# Patient Record
Sex: FEMALE | Race: WHITE
[De-identification: names, ages, dates, MRNs, and addresses within clinical notes are randomized per-mention and may not be internally consistent; named-entity substitution may affect disease eponyms.]

---

## 2017-05-22 ENCOUNTER — HOSPITAL ENCOUNTER (OUTPATIENT)
Dept: HOSPITAL 84 - D.RT | Age: 72
Discharge: HOME | End: 2017-05-22
Attending: INTERNAL MEDICINE
Payer: MEDICARE

## 2017-05-22 VITALS — BODY MASS INDEX: 23.5 KG/M2

## 2017-05-22 DIAGNOSIS — J44.9: Primary | ICD-10-CM

## 2017-09-14 ENCOUNTER — HOSPITAL ENCOUNTER (OUTPATIENT)
Dept: HOSPITAL 84 - D.US | Age: 72
Discharge: HOME | End: 2017-09-14
Attending: THORACIC SURGERY (CARDIOTHORACIC VASCULAR SURGERY)
Payer: MEDICARE

## 2017-09-14 VITALS — BODY MASS INDEX: 23.5 KG/M2

## 2017-09-14 DIAGNOSIS — I65.23: Primary | ICD-10-CM

## 2017-10-19 ENCOUNTER — HOSPITAL ENCOUNTER (INPATIENT)
Dept: HOSPITAL 84 - D.SDCHOLD | Age: 72
LOS: 1 days | Discharge: HOME | DRG: 36 | End: 2017-10-20
Attending: RADIOLOGY | Admitting: RADIOLOGY
Payer: MEDICARE

## 2017-10-19 VITALS — SYSTOLIC BLOOD PRESSURE: 124 MMHG | DIASTOLIC BLOOD PRESSURE: 55 MMHG

## 2017-10-19 VITALS — DIASTOLIC BLOOD PRESSURE: 68 MMHG | SYSTOLIC BLOOD PRESSURE: 101 MMHG

## 2017-10-19 VITALS — DIASTOLIC BLOOD PRESSURE: 69 MMHG | SYSTOLIC BLOOD PRESSURE: 136 MMHG

## 2017-10-19 VITALS — BODY MASS INDEX: 22.3 KG/M2

## 2017-10-19 VITALS — DIASTOLIC BLOOD PRESSURE: 74 MMHG | SYSTOLIC BLOOD PRESSURE: 112 MMHG

## 2017-10-19 VITALS — DIASTOLIC BLOOD PRESSURE: 67 MMHG | SYSTOLIC BLOOD PRESSURE: 140 MMHG

## 2017-10-19 VITALS — SYSTOLIC BLOOD PRESSURE: 115 MMHG | DIASTOLIC BLOOD PRESSURE: 73 MMHG

## 2017-10-19 VITALS — SYSTOLIC BLOOD PRESSURE: 102 MMHG | DIASTOLIC BLOOD PRESSURE: 64 MMHG

## 2017-10-19 VITALS — DIASTOLIC BLOOD PRESSURE: 50 MMHG | SYSTOLIC BLOOD PRESSURE: 134 MMHG

## 2017-10-19 VITALS — SYSTOLIC BLOOD PRESSURE: 137 MMHG | DIASTOLIC BLOOD PRESSURE: 53 MMHG

## 2017-10-19 VITALS — DIASTOLIC BLOOD PRESSURE: 74 MMHG | SYSTOLIC BLOOD PRESSURE: 111 MMHG

## 2017-10-19 VITALS — DIASTOLIC BLOOD PRESSURE: 80 MMHG | SYSTOLIC BLOOD PRESSURE: 161 MMHG

## 2017-10-19 VITALS — DIASTOLIC BLOOD PRESSURE: 73 MMHG | SYSTOLIC BLOOD PRESSURE: 161 MMHG

## 2017-10-19 VITALS — SYSTOLIC BLOOD PRESSURE: 122 MMHG | DIASTOLIC BLOOD PRESSURE: 53 MMHG

## 2017-10-19 VITALS — DIASTOLIC BLOOD PRESSURE: 67 MMHG | SYSTOLIC BLOOD PRESSURE: 110 MMHG

## 2017-10-19 VITALS — DIASTOLIC BLOOD PRESSURE: 58 MMHG | SYSTOLIC BLOOD PRESSURE: 158 MMHG

## 2017-10-19 DIAGNOSIS — I65.22: Primary | ICD-10-CM

## 2017-10-19 LAB
ANION GAP SERPL CALC-SCNC: 11.9 MMOL/L (ref 8–16)
APTT BLD: 25.6 SECONDS (ref 22.8–39.4)
BASOPHILS NFR BLD AUTO: 0.8 % (ref 0–2)
BUN SERPL-MCNC: 35 MG/DL (ref 7–18)
CALCIUM SERPL-MCNC: 10.4 MG/DL (ref 8.5–10.1)
CHLORIDE SERPL-SCNC: 107 MMOL/L (ref 98–107)
CO2 SERPL-SCNC: 31 MMOL/L (ref 21–32)
CREAT SERPL-MCNC: 0.7 MG/DL (ref 0.6–1.3)
EOSINOPHIL NFR BLD: 5.3 % (ref 0–7)
ERYTHROCYTE [DISTWIDTH] IN BLOOD BY AUTOMATED COUNT: 13.3 % (ref 11.5–14.5)
GLUCOSE SERPL-MCNC: 101 MG/DL (ref 74–106)
HCT VFR BLD CALC: 39.7 % (ref 36–48)
HGB BLD-MCNC: 12.7 G/DL (ref 12–16)
IMM GRANULOCYTES NFR BLD: 0 % (ref 0–5)
INR PPP: 0.97 (ref 0.85–1.17)
LYMPHOCYTES NFR BLD AUTO: 33.9 % (ref 15–50)
MCH RBC QN AUTO: 31.6 PG (ref 26–34)
MCHC RBC AUTO-ENTMCNC: 32 G/DL (ref 31–37)
MCV RBC: 98.8 FL (ref 80–100)
MONOCYTES NFR BLD: 11.1 % (ref 2–11)
NEUTROPHILS NFR BLD AUTO: 48.9 % (ref 40–80)
OSMOLALITY SERPL CALC.SUM OF ELEC: 298 MOSM/KG (ref 275–300)
PLATELET # BLD: 262 10X3/UL (ref 130–400)
PMV BLD AUTO: 10.2 FL (ref 7.4–10.4)
POTASSIUM SERPL-SCNC: 3.9 MMOL/L (ref 3.5–5.1)
PROTHROMBIN TIME: 12.8 SECONDS (ref 11.6–15)
RBC # BLD AUTO: 4.02 10X6/UL (ref 4–5.4)
SODIUM SERPL-SCNC: 146 MMOL/L (ref 136–145)
WBC # BLD AUTO: 5.1 10X3/UL (ref 4.8–10.8)

## 2017-10-19 PROCEDURE — 037J3DZ DILATION OF LEFT COMMON CAROTID ARTERY WITH INTRALUMINAL DEVICE, PERCUTANEOUS APPROACH: ICD-10-PCS | Performed by: RADIOLOGY

## 2017-10-19 NOTE — NUR
SHIFT ASSESSMENT COMPLETE, SEE FLOWSHEET FOR COMPLETE DETAILS. PATIENT LAYING
IN BED, DENIES COMPLAINT. A&O X4, RR EVEN AND NON LABORED. S1S2, NSR ON
MONITOR. INCISION ON R GROIN, SITE C/D/I, NO S/S OF BLEEDING. PERIPHERAL
PULSES +2. VSS, WILL MONITOR.

## 2017-10-19 NOTE — HEMODYNAMI
PATIENT:HELLEN HURT                                MEDICAL RECORD: [id number]
: 41                                            [facility]    [id number]                                       ADMISSION DATE: 10/19/17
 
 
 Generatedon:10/19/779698:58
Patient name: HELLEN HURT Patient #: [id number] Visit #: [id number] SSN: :  Date of
study: 10/19/2017
Page: Of
Hemodynamic Procedure Report
****************************
Patient Data
Patient Demographics
Procedure consent was obtained
First Name: HELLEN           Gender: Female
Last Name: BLU             : 1945
Middle Initial: TRENT      Age: 72 year(s)
Patient #: [id number]       Race: Unknown
Visit #: [id number]
Accession #:
[id number]
Additional ID: [id number]
Contact details
Address: [address] [place] [address]  Phone: [phone]
State: [place]
City: [place]
Zip code: [address]
Past Medical History
Allergies
Allergen        Reaction        Date         Comments
Reported
Codeine                         10/19/2017
Penicillins                     10/19/2017
Admission
Admission Data
Admission Date: 10/19/2017  Admission Time: 7:39
Room #: [facility]
Weight (lbs.): 130
Weight (kg.): 58.97
Procedure
Procedure Types
Cath Procedure
Peripheral Cath Diagnostic Procedure
Cath Peripheral
Procedure Description
Procedure Date
Procedure Date: 10/19/2017
Procedure Start Time: 10:53
Procedure Staff
Name                            Function
Juany Alvarado RT                  Circulator
Juany Alvarado RT                  Monitor
Luis Howe RT              Scrub
Blaine Nielson MD              Performing Physician
Filemon Pulido MD                  Performing Physician
Diya Borges RN                Nurse
Procedure Data
Cath Procedure
 
Fluoroscopy
Diagnostic fluoroscopy      Total fluoroscopy Time:
time: 29.9 min              29.9 min
Diagnostic fluoroscopy      Total fluoroscopy dose: 411
dose: 411 mGy               mGy
Contrast Material
Contrast Material Type                       Amount (ml)
Isovue 300                                   155
Entry Location
Entry     Primary  Successful  Side  Size  Upsize Upsize Entry    Closure Succes
sful  Closure
Location                             (Fr)  1 (Fr) 2 (Fr) Remarks  Device        
      Remarks
Femoral                        Right 5 Fr
artery
Diagnostic catheters
Device Type               Used For           End Catheter
Placement
Merit Impress Hercules
5Fr 125CM catheter
Merit ULTRA BOLUS FLUSH
5Fr 90CM catheter
Cook HN5 5F/100CM
catheter
Procedure Medications
Medication           Administration Route Dosage
Versed               I.V.                 0.5 mg
Fentanyl             I.V.                 25 mcg
Versed               I.V.                 0.5 mg
Fentanyl             I.V.                 25 mcg
Heparin Bolus                             2000
Heparin Bolus                             1000
Versed               I.V.                 0.5 mg
Fentanyl             I.V.                 25 mcg
Heparin Flush Bag    added to field       4
(1000units/500ml NS)
Lidocaine 1%         added to field       20
Oxygen               NC                   4 l/min
Hemodynamics
Rest
Pre Cath      Intra         NCS           Post Cath
Vital Signs
Time     Heart  Resp   SPO2 etCO2   NIBP (mmHg) Rhythm  Pain    Sedation
Rate   (ipm)  (%)  (mmHg)                      Status  Level
(bpm)
10:37:56 65     26          0       129/86(104) NSR     0 (11)  10(A)
, No
pain
10:42:02 67     15     61   0       111/88(103) NSR     0 (11)  10(A)
, No
pain
10:45:59 69     6      89   34      125/90(107) NSR     0 (11)  10(A)
, No
pain
10:49:59 70     23          12.8    143/96(120) NSR     0 (11)  10(A)
, No
pain
10:54:06 67     24          27.9    129/89(109) NSR     0 (11)  10(A)
, No
pain
 
10:58:14 64     22     100  1.5     125/76(108) NSR     0 (11)  10(A)
, No
pain
11:02:20 66     17     88   30.2    108/79(88)  NSR     0 (11)  10(A)
, No
pain
11:06:18 65     10     97   30.9    106/80(99)  NSR     0 (11)  10(A)
, No
pain
11:10:17 63     10     94   32.4    110/74(85)  NSR     0 (11)  10(A)
, No
pain
11:14:19 65     10     96   34      108/78(93)  NSR     0 (11)  10(A)
, No
pain
11:18:19 67     10     95   19.6    107/80(93)  NSR     0 (11)  10(A)
, No
pain
11:22:20 64     12     95   0       108/72(91)  NSR     0 (11)  10(A)
, No
pain
11:26:18 65     16     93   11.3    114/81(95)  NSR     0 (11)  10(A)
, No
pain
11:30:22 65     16     93   34      105/73(87)  NSR     0 (11)  10(A)
, No
pain
11:34:21 66     15     96   15.8    107/75(90)  NSR     0 (11)  10(A)
, No
pain
11:38:21 65     13     98   21.1    114/78(102) NSR     0 (11)  10(A)
, No
pain
11:42:23 68     17     96   30.2    114/77(90)  NSR     0 (11)  10(A)
, No
pain
11:46:26 65     18     96   12.8    109/73(94)  NSR     0 (11)  10(A)
, No
pain
11:50:26 65     15     98   36.2    109/78(96)  NSR     0 (11)  10(A)
, No
pain
11:54:30 61     14     100  29.4    115/70(86)  NSR     0 (11)  10(A)
, No
pain
11:58:33 64     12     98   0       124/72(96)  NSR     0 (11)  10(A)
, No
pain
12:02:43 60     12     88   29.4    100/68(84)  NSR     0 (11)  10(A)
, No
pain
12:06:43 61     14     95   31.7    101/69(84)  NSR     0 (11)  10(A)
, No
pain
12:10:39 63     14     93   34      98/73(90)   NSR     0 (11)  10(A)
, No
pain
12:14:36 63     19     88   32.4    103/72(85)  NSR     0 (11)  10(A)
, No
pain
 
12:18:34 62     17     92   32.4    114/82(95)  NSR     0 (11)  10(A)
, No
pain
12:22:37 61     17     89   30.9    121/75(87)  NSR     0 (11)  10(A)
, No
pain
12:26:45 61     13     87   31.7    100/69(83)  NSR     0 (11)  10(A)
, No
pain
12:31:34 61     14     84   34.7    113/75(90)  NSR     0 (11)  10(A)
, No
pain
12:35:36 60     11     87   23.4    119/80(101) NSR     0 (11)  10(A)
, No
pain
12:39:42 61     16     97   34.7    116/73(88)  NSR     0 (11)  10(A)
, No
pain
12:43:45 62     18     94   13.6    110/74(99)  NSR     0 (11)  10(A)
, No
pain
12:47:43 109    18     96   22.6    108/88(104) NSR     0 (11)  10(A)
, No
pain
Medications
Time     Medication       Route  Dose  Verified Delivered Reason     Notes  Effe
ctiveness
by       by
10:20:19 Oxygen           NC     4     Diya   Diya    used for
l/min Jony Borges RN procedure
RN
10:20:30 Heparin Flush    added  4     Diya   Filemon      used for
Bag              to     BAGS  Jony Pulido MD procedure
(1000units/500ml field        RN
NS)
10:20:56 Lidocaine 1%     added  20ml  Diya   Filemon      for local
to     vial  Jony Pulido MD anesthetic
field        RN
10:56:58 Versed           I.V.   0.5   Filemon Keane    for
mg    Jony Pulido RN sedation
MD
10:57:12 Fentanyl         I.V.   25    Filemon Keane    for
mcg   Jony Pulido RN sedation
MD
11:03:06 Versed           I.V.   0.5   Filemon Keane    for
Jony Albarado RN sedation
MD
11:03:14 Fentanyl         I.V.   25    Filemon Keane    for
mcg   Jony Pulido RN sedation
MD
11:27:30 Heparin Bolus    ia     2000  Tess Conklin MD MD
11:50:57 Heparin Bolus    ia     1000  Tess Conklin MD MD
11:54:52 Versed           I.V.   0.5   Filemon Keane    for
mg    Jony Pulido RN sedation
MD
11:55:02 Fentanyl         I.V.   25    Filemon Keane    for
 
mcg   Jony Pulido RN sedation
MD
Procedure Log
Time     Note
9:45:08  Patient Weight : 130 lbs
9:45:50  Use device set IR Diagnostic
9:45:53  Sterile Angiographic Pack opened to sterile field.
9:45:54  Bag Decanter opened to sterile field.
9:45:55  Acist Manifold opened to sterile field.
9:45:56  Acist Hand Control opened to sterile field.
9:45:57  Acist Syringe opened to sterile field.
9:50:50  BasixTOUCH Inflation Syringe opened to sterile field.
9:50:52  TUBING, CONTRAST INJCTN HI PRES opened to sterile field.
9:50:53  Cook HERNANDEZ 260 guide wire opened to sterile field.
9:50:53  Terumo ANGLE 260cm glide wire opened to sterile field.
9:50:54  Cook DOC .035 guide wire opened to sterile field.
9:50:55  Terumo 5Fr [place] Sheath opened to sterile field.
10:16:50 Time tracking: Regular hours
10:17:07 Plan of Care:Hemodynamics will remain stable., Cardiac rhythm will
remain stable., Comfort level will be maintained., Respiratory function
will remain adequate., Patient/ family verbilizes understanding of
procedure., Procedure tolerated without complication., Recovers from
procedure without complications..
10:17:14 Patient received from Outpatients to IR Alert and oriented. Tansferred
to table in Supine position.
10:17:23 Signed procedure consent form obtained from patient.
10:17:32 H&P Date Dictated: 10/19/2017 Within 30 days and on chart..
10:17:34 Pre-procedure instructions explained to patient.
10:17:36 Pre-op teaching completed and patient verbalized understanding.
10:17:39 Family in waiting room.
10:17:50 Patient allergic to Codeine
10:18:39 Patient allergic to Penicillins
10:19:26 Is the patient allergic to Iodine/contrast media? No.
10:19:29 Is patient on blood thinner?Yes, asa
10:20:19 Oxygen 4 l/min NC was administered by Diya Borges RN; used for
procedure;
10:20:30 Heparin Flush Bag (1000units/500ml NS) 4 BAGS added to field was
administered by Filemon Pulido MD; used for procedure;
10:20:56 Lidocaine 1% 20ml vial added to field was administered by Filemon Pulido MD; for local anesthetic;
10:21:11 **ACC** The patient was administered the following blood thiners within
the last 24 hours: **ACC**Aspirin
10:21:15 **ACC** The patient was administered the following blood thiners within
the last 24 hours: **ACC**Plavix
10:21:17 Patient diabetic? No.
10:21:22 ----Pre-sedation anethsthesia assessment.----
10::22 -----------------------------------------------------------------------
-
10:21:26 Previous problem with sedation/anesthesia? No ?
10:21:30 Snore? No
10::34 Sleep apnea? No
10::36 Deviated septum? No
10::39 Opens mouth fully? Yes
10:21:40 Sticks out tongue? Yes
10:22:15 Airway obstruction? No patient has htn and cad
10:22:22 Dentures? Yes secured
10::31 Pre procedure: right dorsailis pedis pulse Doppler
10::41 Pre procedure: left dorsailis pedis pulse Doppler
10:22:46 Pre procedure: right posterior tibial pulse Doppler
10::50 Pre procedure: left posterior tibial pulse 0-Absent
 
10:23:17 IV patent on arrival in right hand with 0.9% NaCl at [facility].
10:23:26 Right groin area was prepped with chlora-prep and draped in sterile
fashion
10:23:30 -----------------------------------------------------------------------
-
10:23:32 Alarms reviewed by ADIA JESUS
10:23:33 Sharps counted by scrub and verified by JOS
10:23:35 -----------------------------------------------------------------------
-
10:36:50 Vital chart was started
10:47:11 Bernard ARTIS 6FR. 90cm guide sheath opened to sterile field.
10:47:14 A Merit Impress Hercules 5Fr 125CM catheter was advanced over the wire
and used for .
10:47:18 -----------------------------------------------------------------------
-
10:51:13 Physician arrived
10:53:21 --------ALL STOP TIME OUT------
10:53:22 Final Timeout: patient, procedure, and site verified with staff and
physician. All members of the team are in agreement.
10:53:36 Sedation plan: IV Moderate Sedation Versed, Fentanyl
10:53:43 Full Disclosure recording started
10:53:43 Procedure started.
10:53:49 Local anesthetic to right femoral artery with Lidocaine 1% by Filemon Pulido MD.**INITIAL ACCESS ONLY**
10:55:30 Micropuncture VSI 4FR kit opened to sterile field.
10:56:58 Versed 0.5 mg I.V. was administered by Diya Borges RN; for sedation;
10:57:12 Fentanyl 25 mcg I.V. was administered by Diya Borges RN; for sedation
;
10:58:15 [employer] Choice PT Extra Support J 300cm .014 gu opened to sterile
field.
10:59:11 Arterial access obtained using ultrasound guidance.
10:59:24 A 5 Fr sheath was inserted into the Right Femoral artery
11:02:58 Terumo 5FR ANGLED 65CM glide catheter opened to sterile field.
11:03:06 Versed 0.5 mg I.V. was administered by Diya Borges RN; for sedation;
11:03:14 Fentanyl 25 mcg I.V. was administered by Diya Borges RN; for sedation
;
11:03:19 A Merit ULTRA BOLUS FLUSH 5Fr 90CM catheter was advanced over the wire
and used for .
11:06:42 Angiography was performed.
11:07:56 A Reble HN5 5F/100CM catheter was advanced over the wire and used for .
11:09:41 Terumo TORQUE DEVICE PLASTIC .038 opened to sterile field.
11:13:33 Cook ROADRUNNER 260 .035 glide wire opened to sterile field.
11:16:49 Terumo 5FR ANGLED 100CM glide catheter opened to sterile field.
11:27:30 Heparin Bolus 2000 ia was administered by Filemon Pulido MD; ;
11:28:17 [place] Sci AMPLATZ 260CM SHORT TAPER guide wire opened to sterile field
.
11:36:44 Terumo Super Stiff Angled 260cm glide wire opened to sterile field.
11:50:57 Heparin Bolus 1000 ia was administered by Filemon Pulido MD; ;
11:54:44 SPIDER EMBOLIC PROTECTION DEVICE 5MM opened to sterile field.
11:54:52 Versed 0.5 mg I.V. was administered by Diya Borges RN; for sedation;
11:55:02 Fentanyl 25 mcg I.V. was administered by Diya Borges RN; for sedation
;
11:56:14 Inflation number: 1 A Abbott VIATRAC 4 x 2 x 135 balloon was prepped an
d
advanced across the Undefined1, then inflated to 10 HARLEY for 0:08
(min:sec).
12:11:21 PROTEGE RX TAPERED 7MM X 30MM X 135CM stent was deployed across
Undefined1 .
12:13:03 Inflation number: 2 A Abbott VIATRAC 6 x 2 x 135 balloon was prepped an
d
 
advanced across the Undefined1, then inflated to 8 HARLEY for 0:06
(min:sec).
12:19:19 [place] 6Fr sheath opened to sterile field.
12:19:40 Cordis 6Fr Exoseal opened to sterile field.
12:25:20 Procedure ended.(Physican Out)
12:34:46 Fluoroscopy time 29.90 minutes.
12:34:57 Fluoroscopy dose: 411 mGy
12:34:57 Flurop Dose total: 411
12:35:04 Contrast amount:Isovue 300 155ml.
12:35:06 Sharps counted by scrub and verified by R.N.
12:35:07 Procedure and supply charges have been captured, reviewed, submitted an
d
are correct.
12:48:21 Vital chart was stopped
12:48:25 Full Disclosure recording stopped
Intervention Summary
Intervention Notes
Time     ActionType  Lesion and  Equipment Action#  Pressure  Duration
Attributes  Used
11:56:14 Inflate     Undefined1  Abbott    1        10        00:08
balloon                 VIATRAC 4
x 2 x 135
balloon
12:11:21 Deploy self Undefined1  PROTEGE   1
expanding               RX
stent                   TAPERED
7MM X
30MM X
135CM
stent
12:13:03 Inflate     Undefined1  Abbott    2        8         00:06
balloon                 VIATRAC 6
x 2 x 135
balloon
Device Usage
Item Name     Manufacture  Quantity  Catalog Number   Hospital Part    Current M
inimal Lot# /
Charge   Number  Stock   Stock   Serial#
Code
Sterile       Cardinal     1         [id number]       303601           935240  5
Angiographic  Health
Pack
Bag Decanter  Microtek     1         2002S            [id number] 31940   575364  5
Medical Inc.
Acist         Acist        1         71941            397412   210081  590734  5
Manifold      Medical
Systems Inc
Acist Hand    Acist        1         24400            678412   248713  099005  5
Control       Medical
Systems Inc
Acist Syringe Acist        1         30030            403066   489625  044550  2
0
Medical
Systems [employer]
BasixTOUCH    Merit        1         GK1037           409256   155179  715448  5
Inflation     Medical
Syringe
TUBING,       Merit        1         [id number]          583360   511651  866468  5
CONTRAST      Medical
INJCTN HI
 
PRES
Terumo ANGLE  Terumo       1         LA0469           659389   055421  948661  5
260cm glide
wire
[facility] 1         Q41939           713583           193611  5
       [id number]
260 guide
wire
[place] DOC .035 [facility] 1         H83838           027907           142234  5
       [id number]
guide wire
Terumo 5Fr    Terumo       1         [id number]           206891   842510  135375  4
0
[place]
Sheath
[place] RAABE    [facility] 1         Q71583           670254           386657  5
6FR. 90cm
guide sheath
Merit Impress Merit        1         511035CMC        816935   559955  110887  5
Hercules 5Fr Medical
125CM
catheter
Micropuncture VSI VASCULAR 1         7266V            181783           865401  5
VSI 4FR kit   SOLUTIONS
Bettendorf Sci    B[place]f       1         [id number]    243696   176926  225831  5
Choice PT     Scientific
Extra Support
J 300cm .014
gu
Terumo 5FR    Terumo       1                     874846           800427  5
ANGLED 65CM
glide
catheter
Merit ULTRA   Merit        1         [id number]    973129           370299  5
BOLUS FLUSH   Medical
5Fr 90CM
catheter
Cook HN5      [facility] 1         I80901           590787           633282  2
5F/100CM
catheter
Terumo TORQUE [place]       1         TD01             180352   916985  506022  5
DEVICE        Scientific
PLASTIC .038
Cook          [facility] 1         B15787           285248   798276  988298  5
       [id number]
ROADNNER
260 .035
glide wire
Terumo 5FR    Terumo       1                     321950   13065   784038  4
ANGLED 100CM
glide
catheter
[place] Sci    [place]       1         [id number]       983687   970714  994301  5
AMPLATZ 260CM Scientific
SHORT TAPER
guide wire
Terumo Super  Terumo       1         RE8878           347055   432999  807728  5
Stiff Angled
260cm glide
wire
 
SPIDER        Medtronic    1         [id number]  998654           623160  5
EMBOLIC
PROTECTION
DEVICE 5MM
[place]        Phoenix       1         6352065-93       271932   012407  279299  5
VIATRAC 4 x 2 Vascular
x 135 balloon
PROTEGE RX    Medtronic    1         [id number]    325466   555003  489025  5
       [id number]
TAPERED 7MM X
30MM X 135CM
stent
[place]        Abbott       1         4763399-17       611856   309798  182827  5
VIATRAC 6 x 2 Vascular
x 135 balloon
[place] 6Fr   [place]      1         907013           736545           329339  5
       [id number]
sheath
Cordis 6Fr    Cardinal     1                     177583   165483  625223  1
0      [id number]
[facility]       Health
Signature Audit [place]
Stage           Time        Signature      Unsigned
Intra-Procedure 10/19/2017  Juany Alvarado RT(R)
12:48:19 PM RT(R)          10/19/2017 12:57:23
PM
Intra-Procedure 10/19/2017  Juany Alvarado
12:58:41 PM RT(R)
Signatures
Monitor : Juany Alvarado RT Signature :
______________________________
Date : ______________ Time :
______________
 
 
 
 
 
 
 
 
 
 
 
 
 
 
 
 
 
 
 
 
 
 
[facility]                                          
[address] [place] [address]

## 2017-10-19 NOTE — NUR
RECEIVED PT TO ROOM CV04 VIA BED. ICU MONITORS CONNECTED. PROCEDURE NURSE
CONFIRMS PT CURRENT STATE IS HER BASELINE.

## 2017-10-19 NOTE — NUR
PATIENT INCONTINENT OF URNINE. WHITE PAD SATURATED. LINENS CHANGED AND PATIENT
CLEANED. DENIES FURTHER NEED. WILL MONITOR.

## 2017-10-20 VITALS — SYSTOLIC BLOOD PRESSURE: 132 MMHG | DIASTOLIC BLOOD PRESSURE: 68 MMHG

## 2017-10-20 VITALS — SYSTOLIC BLOOD PRESSURE: 114 MMHG | DIASTOLIC BLOOD PRESSURE: 56 MMHG

## 2017-10-20 VITALS — DIASTOLIC BLOOD PRESSURE: 71 MMHG | SYSTOLIC BLOOD PRESSURE: 137 MMHG

## 2017-10-20 VITALS — SYSTOLIC BLOOD PRESSURE: 120 MMHG | DIASTOLIC BLOOD PRESSURE: 58 MMHG

## 2017-10-20 VITALS — SYSTOLIC BLOOD PRESSURE: 111 MMHG | DIASTOLIC BLOOD PRESSURE: 54 MMHG

## 2017-10-20 VITALS — SYSTOLIC BLOOD PRESSURE: 128 MMHG | DIASTOLIC BLOOD PRESSURE: 61 MMHG

## 2017-10-20 VITALS — DIASTOLIC BLOOD PRESSURE: 50 MMHG | SYSTOLIC BLOOD PRESSURE: 128 MMHG

## 2017-10-20 VITALS — SYSTOLIC BLOOD PRESSURE: 119 MMHG | DIASTOLIC BLOOD PRESSURE: 57 MMHG

## 2017-10-20 NOTE — NUR
DC INSTRUCTIONS REVIEWED WITH PT AND SIGNIFICANT OTHER.  IV DC'D CATH FULLY
INTACT.  PT REMINDED THAT AM MEDICATIONS GIVEN.  REVIEWED GIVEN MEDS WITH S.O.
 ALL QUESTIONS ANSWERED AT THIS TIME.  PT INFORMED OF NEED FOR FOLLOW UP
APPOINTMENT IN 4 WEEKS AND INSTRUCTED IF OFFICE HAS NOT CALLED BY MONDAY TO
CALL TO SCHEDULE APPOINTMENT.  GROIN SITE CLEAN DRY INTACT NO S/SX OF HEMATOMA
OR BLEEDING NOTED.

## 2017-12-31 ENCOUNTER — HOSPITAL ENCOUNTER (EMERGENCY)
Dept: HOSPITAL 84 - D.ER | Age: 72
Discharge: HOME | End: 2017-12-31
Payer: MEDICARE

## 2017-12-31 VITALS — BODY MASS INDEX: 22.3 KG/M2
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
## 2017-12-31 DIAGNOSIS — S70.01XA: ICD-10-CM

## 2017-12-31 DIAGNOSIS — S09.90XA: ICD-10-CM
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
## 2017-12-31 DIAGNOSIS — R53.1: ICD-10-CM

## 2017-12-31 DIAGNOSIS — S70.02XA: ICD-10-CM

## 2017-12-31 DIAGNOSIS — I10: ICD-10-CM
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
## 2017-12-31 DIAGNOSIS — G20: Primary | ICD-10-CM

## 2017-12-31 LAB
ALBUMIN SERPL-MCNC: 3.5 G/DL (ref 3.4–5)
ALP SERPL-CCNC: 85 U/L (ref 46–116)
ALT SERPL-CCNC: 6 U/L (ref 10–68)
ANION GAP SERPL CALC-SCNC: 9.3 MMOL/L (ref 8–16)
APPEARANCE UR: CLEAR
BACTERIA #/AREA URNS HPF: (no result) /HPF
BASOPHILS NFR BLD AUTO: 0.3 % (ref 0–2)
BILIRUB SERPL-MCNC: 0.9 MG/DL (ref 0.2–1.3)
BILIRUB SERPL-MCNC: NEGATIVE MG/DL
BUN SERPL-MCNC: 19 MG/DL (ref 7–18)
CALCIUM SERPL-MCNC: 10.2 MG/DL (ref 8.5–10.1)
CHLORIDE SERPL-SCNC: 105 MMOL/L (ref 98–107)
CO2 SERPL-SCNC: 29.4 MMOL/L (ref 21–32)
COLOR UR: YELLOW
CREAT SERPL-MCNC: 0.6 MG/DL (ref 0.6–1.3)
EOSINOPHIL NFR BLD: 1 % (ref 0–7)
ERYTHROCYTE [DISTWIDTH] IN BLOOD BY AUTOMATED COUNT: 14.4 % (ref 11.5–14.5)
GLOBULIN SER-MCNC: 3.1 G/L
GLUCOSE SERPL-MCNC: 116 MG/DL (ref 74–106)
GLUCOSE SERPL-MCNC: NEGATIVE MG/DL
HCT VFR BLD CALC: 30.8 % (ref 36–48)
HGB BLD-MCNC: 9.7 G/DL (ref 12–16)
IMM GRANULOCYTES NFR BLD: 0.3 % (ref 0–5)
KETONES UR STRIP-MCNC: NEGATIVE MG/DL
LYMPHOCYTES NFR BLD AUTO: 13.7 % (ref 15–50)
MCH RBC QN AUTO: 30.9 PG (ref 26–34)
MCHC RBC AUTO-ENTMCNC: 31.5 G/DL (ref 31–37)
MCV RBC: 98.1 FL (ref 80–100)
MONOCYTES NFR BLD: 7.4 % (ref 2–11)
NEUTROPHILS NFR BLD AUTO: 77.3 % (ref 40–80)
NITRITE UR-MCNC: POSITIVE MG/ML
OSMOLALITY SERPL CALC.SUM OF ELEC: 281 MOSM/KG (ref 275–300)
PH UR STRIP: 5 [PH] (ref 5–6)
PLATELET # BLD: 385 10X3/UL (ref 130–400)
PMV BLD AUTO: 9.3 FL (ref 7.4–10.4)
POTASSIUM SERPL-SCNC: 3.7 MMOL/L (ref 3.5–5.1)
PROT SERPL-MCNC: 6.6 G/DL (ref 6.4–8.2)
PROT UR-MCNC: NEGATIVE MG/DL
RBC # BLD AUTO: 3.14 10X6/UL (ref 4–5.4)
RBC #/AREA URNS HPF: (no result) /HPF (ref 0–5)
SODIUM SERPL-SCNC: 140 MMOL/L (ref 136–145)
SP GR UR STRIP: 1.02 (ref 1–1.02)
SQUAMOUS #/AREA URNS HPF: (no result) /HPF (ref 0–5)
UROBILINOGEN UR-MCNC: NORMAL MG/DL
WBC # BLD AUTO: 7.8 10X3/UL (ref 4.8–10.8)
WBC #/AREA URNS HPF: (no result) /HPF (ref 0–5)

## 2018-01-05 ENCOUNTER — HOSPITAL ENCOUNTER (OUTPATIENT)
Dept: HOSPITAL 84 - D.US | Age: 73
Discharge: HOME | End: 2018-01-05
Attending: RADIOLOGY
Payer: MEDICARE

## 2018-01-05 VITALS — BODY MASS INDEX: 22.3 KG/M2

## 2018-01-05 DIAGNOSIS — I77.1: ICD-10-CM

## 2018-01-05 DIAGNOSIS — I65.22: Primary | ICD-10-CM

## 2018-01-05 DIAGNOSIS — R22.32: ICD-10-CM

## 2018-02-01 ENCOUNTER — HOSPITAL ENCOUNTER (OUTPATIENT)
Dept: HOSPITAL 84 - D.M2 | Age: 73
LOS: 1 days | Discharge: HOME | End: 2018-02-02
Attending: RADIOLOGY
Payer: MEDICARE

## 2018-02-01 VITALS — SYSTOLIC BLOOD PRESSURE: 119 MMHG | DIASTOLIC BLOOD PRESSURE: 71 MMHG

## 2018-02-01 VITALS — SYSTOLIC BLOOD PRESSURE: 108 MMHG | DIASTOLIC BLOOD PRESSURE: 51 MMHG

## 2018-02-01 VITALS — SYSTOLIC BLOOD PRESSURE: 120 MMHG | DIASTOLIC BLOOD PRESSURE: 64 MMHG

## 2018-02-01 VITALS — BODY MASS INDEX: 22.3 KG/M2

## 2018-02-01 DIAGNOSIS — I70.218: Primary | ICD-10-CM

## 2018-02-01 DIAGNOSIS — Z01.812: ICD-10-CM

## 2018-02-01 LAB
ANION GAP SERPL CALC-SCNC: 9.4 MMOL/L (ref 8–16)
APTT BLD: 27.7 SECONDS (ref 22.8–39.4)
BASOPHILS NFR BLD AUTO: 0.4 % (ref 0–2)
BUN SERPL-MCNC: 25 MG/DL (ref 7–18)
CALCIUM SERPL-MCNC: 9.4 MG/DL (ref 8.5–10.1)
CHLORIDE SERPL-SCNC: 109 MMOL/L (ref 98–107)
CO2 SERPL-SCNC: 28.3 MMOL/L (ref 21–32)
CREAT SERPL-MCNC: 0.7 MG/DL (ref 0.6–1.3)
EOSINOPHIL NFR BLD: 3.6 % (ref 0–7)
ERYTHROCYTE [DISTWIDTH] IN BLOOD BY AUTOMATED COUNT: 13.9 % (ref 11.5–14.5)
GLUCOSE SERPL-MCNC: 98 MG/DL (ref 74–106)
HCT VFR BLD CALC: 37.1 % (ref 36–48)
HGB BLD-MCNC: 11.8 G/DL (ref 12–16)
IMM GRANULOCYTES NFR BLD: 0.2 % (ref 0–5)
INR PPP: 1.14 (ref 0.85–1.17)
LYMPHOCYTES NFR BLD AUTO: 31.2 % (ref 15–50)
MCH RBC QN AUTO: 30.4 PG (ref 26–34)
MCHC RBC AUTO-ENTMCNC: 31.8 G/DL (ref 31–37)
MCV RBC: 95.6 FL (ref 80–100)
MONOCYTES NFR BLD: 10.2 % (ref 2–11)
NEUTROPHILS NFR BLD AUTO: 54.4 % (ref 40–80)
OSMOLALITY SERPL CALC.SUM OF ELEC: 288 MOSM/KG (ref 275–300)
PLATELET # BLD: 224 10X3/UL (ref 130–400)
PMV BLD AUTO: 10.2 FL (ref 7.4–10.4)
POTASSIUM SERPL-SCNC: 3.7 MMOL/L (ref 3.5–5.1)
PROTHROMBIN TIME: 14.2 SECONDS (ref 11.6–15)
RBC # BLD AUTO: 3.88 10X6/UL (ref 4–5.4)
SODIUM SERPL-SCNC: 143 MMOL/L (ref 136–145)
WBC # BLD AUTO: 4.5 10X3/UL (ref 4.8–10.8)

## 2018-02-02 VITALS — DIASTOLIC BLOOD PRESSURE: 57 MMHG | SYSTOLIC BLOOD PRESSURE: 141 MMHG

## 2018-02-02 VITALS — DIASTOLIC BLOOD PRESSURE: 76 MMHG | SYSTOLIC BLOOD PRESSURE: 108 MMHG

## 2018-02-02 VITALS — DIASTOLIC BLOOD PRESSURE: 81 MMHG | SYSTOLIC BLOOD PRESSURE: 110 MMHG

## 2018-02-02 VITALS — SYSTOLIC BLOOD PRESSURE: 130 MMHG | DIASTOLIC BLOOD PRESSURE: 58 MMHG

## 2018-02-02 LAB
ANION GAP SERPL CALC-SCNC: 9.9 MMOL/L (ref 8–16)
BASOPHILS NFR BLD AUTO: 0.3 % (ref 0–2)
BUN SERPL-MCNC: 28 MG/DL (ref 7–18)
CALCIUM SERPL-MCNC: 9.2 MG/DL (ref 8.5–10.1)
CHLORIDE SERPL-SCNC: 109 MMOL/L (ref 98–107)
CO2 SERPL-SCNC: 27.8 MMOL/L (ref 21–32)
CREAT SERPL-MCNC: 0.8 MG/DL (ref 0.6–1.3)
EOSINOPHIL NFR BLD: 1.3 % (ref 0–7)
ERYTHROCYTE [DISTWIDTH] IN BLOOD BY AUTOMATED COUNT: 13.9 % (ref 11.5–14.5)
GLUCOSE SERPL-MCNC: 106 MG/DL (ref 74–106)
HCT VFR BLD CALC: 31.8 % (ref 36–48)
HGB BLD-MCNC: 9.9 G/DL (ref 12–16)
IMM GRANULOCYTES NFR BLD: 0.3 % (ref 0–5)
INR PPP: 1.15 (ref 0.85–1.17)
LYMPHOCYTES NFR BLD AUTO: 25.9 % (ref 15–50)
MCH RBC QN AUTO: 30.1 PG (ref 26–34)
MCHC RBC AUTO-ENTMCNC: 31.1 G/DL (ref 31–37)
MCV RBC: 96.7 FL (ref 80–100)
MONOCYTES NFR BLD: 11.3 % (ref 2–11)
NEUTROPHILS NFR BLD AUTO: 60.9 % (ref 40–80)
OSMOLALITY SERPL CALC.SUM OF ELEC: 290 MOSM/KG (ref 275–300)
PLATELET # BLD: 231 10X3/UL (ref 130–400)
PMV BLD AUTO: 10 FL (ref 7.4–10.4)
POTASSIUM SERPL-SCNC: 3.7 MMOL/L (ref 3.5–5.1)
PROTHROMBIN TIME: 14.3 SECONDS (ref 11.6–15)
RBC # BLD AUTO: 3.29 10X6/UL (ref 4–5.4)
SODIUM SERPL-SCNC: 143 MMOL/L (ref 136–145)
WBC # BLD AUTO: 7.2 10X3/UL (ref 4.8–10.8)

## 2018-02-08 ENCOUNTER — HOSPITAL ENCOUNTER (OUTPATIENT)
Dept: HOSPITAL 84 - D.US | Age: 73
Discharge: HOME | End: 2018-02-08
Attending: RADIOLOGY
Payer: MEDICARE

## 2018-02-08 VITALS — BODY MASS INDEX: 22.3 KG/M2

## 2018-02-08 DIAGNOSIS — I65.23: Primary | ICD-10-CM

## 2018-03-05 ENCOUNTER — HOSPITAL ENCOUNTER (OUTPATIENT)
Dept: HOSPITAL 84 - D.US | Age: 73
Discharge: HOME | End: 2018-03-05
Attending: RADIOLOGY
Payer: MEDICARE

## 2018-03-05 VITALS — BODY MASS INDEX: 22.3 KG/M2

## 2018-03-05 DIAGNOSIS — I77.1: Primary | ICD-10-CM

## 2018-10-30 ENCOUNTER — HOSPITAL ENCOUNTER (EMERGENCY)
Dept: HOSPITAL 84 - D.ER | Age: 73
Discharge: ANOTHER HOSPITAL | End: 2018-10-30
Payer: MEDICARE

## 2018-10-30 VITALS — BODY MASS INDEX: 21.21 KG/M2 | WEIGHT: 124.26 LBS | HEIGHT: 64 IN

## 2018-10-30 VITALS — DIASTOLIC BLOOD PRESSURE: 59 MMHG | SYSTOLIC BLOOD PRESSURE: 125 MMHG
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
## 2018-10-30 DIAGNOSIS — D64.9: ICD-10-CM

## 2018-10-30 DIAGNOSIS — S02.40DA: ICD-10-CM

## 2018-10-30 DIAGNOSIS — S02.40FA: ICD-10-CM

## 2018-10-30 DIAGNOSIS — J44.9: ICD-10-CM

## 2018-10-30 DIAGNOSIS — I25.10: ICD-10-CM

## 2018-10-30 DIAGNOSIS — E87.5: ICD-10-CM

## 2018-10-30 DIAGNOSIS — S02.82XA: Primary | ICD-10-CM
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
## 2018-10-30 DIAGNOSIS — I10: ICD-10-CM

## 2018-10-30 LAB
ALBUMIN SERPL-MCNC: 3.9 G/DL (ref 3.4–5)
ALP SERPL-CCNC: 43 U/L (ref 46–116)
ALT SERPL-CCNC: 12 U/L (ref 10–68)
ANION GAP SERPL CALC-SCNC: 12.7 MMOL/L (ref 8–16)
BASOPHILS NFR BLD AUTO: 0.3 % (ref 0–2)
BILIRUB SERPL-MCNC: 0.67 MG/DL (ref 0.2–1.3)
BUN SERPL-MCNC: 22 MG/DL (ref 7–18)
CALCIUM SERPL-MCNC: 10 MG/DL (ref 8.5–10.1)
CHLORIDE SERPL-SCNC: 104 MMOL/L (ref 98–107)
CO2 SERPL-SCNC: 26.7 MMOL/L (ref 21–32)
CREAT SERPL-MCNC: 0.6 MG/DL (ref 0.6–1.3)
EOSINOPHIL NFR BLD: 2.1 % (ref 0–7)
ERYTHROCYTE [DISTWIDTH] IN BLOOD BY AUTOMATED COUNT: 16.4 % (ref 11.5–14.5)
GLOBULIN SER-MCNC: 2.8 G/L
GLUCOSE SERPL-MCNC: 100 MG/DL (ref 74–106)
HCT VFR BLD CALC: 32.3 % (ref 36–48)
HGB BLD-MCNC: 9.7 G/DL (ref 12–16)
IMM GRANULOCYTES NFR BLD: 0.1 % (ref 0–5)
INR PPP: 1.02 (ref 0.85–1.17)
LYMPHOCYTES NFR BLD AUTO: 17.9 % (ref 15–50)
MCH RBC QN AUTO: 24.4 PG (ref 26–34)
MCHC RBC AUTO-ENTMCNC: 30 G/DL (ref 31–37)
MCV RBC: 81.4 FL (ref 80–100)
MONOCYTES NFR BLD: 9.4 % (ref 2–11)
NEUTROPHILS NFR BLD AUTO: 70.2 % (ref 40–80)
OSMOLALITY SERPL CALC.SUM OF ELEC: 280 MOSM/KG (ref 275–300)
PLATELET # BLD: 355 10X3/UL (ref 130–400)
PMV BLD AUTO: 10.3 FL (ref 7.4–10.4)
POTASSIUM SERPL-SCNC: 4.4 MMOL/L (ref 3.5–5.1)
PROT SERPL-MCNC: 6.7 G/DL (ref 6.4–8.2)
PROTHROMBIN TIME: 13 SECONDS (ref 11.6–15)
RBC # BLD AUTO: 3.97 10X6/UL (ref 4–5.4)
SODIUM SERPL-SCNC: 139 MMOL/L (ref 136–145)
WBC # BLD AUTO: 9 10X3/UL (ref 4.8–10.8)

## 2018-12-11 ENCOUNTER — HOSPITAL ENCOUNTER (OUTPATIENT)
Dept: HOSPITAL 84 - D.US | Age: 73
Discharge: HOME | End: 2018-12-11
Attending: THORACIC SURGERY (CARDIOTHORACIC VASCULAR SURGERY)
Payer: MEDICARE

## 2018-12-11 VITALS — BODY MASS INDEX: 21.3 KG/M2

## 2018-12-11 DIAGNOSIS — I65.23: Primary | ICD-10-CM

## 2019-01-22 ENCOUNTER — HOSPITAL ENCOUNTER (OUTPATIENT)
Dept: HOSPITAL 84 - D.US | Age: 74
Discharge: HOME | End: 2019-01-22
Attending: THORACIC SURGERY (CARDIOTHORACIC VASCULAR SURGERY)
Payer: MEDICARE

## 2019-01-22 VITALS — BODY MASS INDEX: 21.3 KG/M2

## 2019-01-22 DIAGNOSIS — I65.23: Primary | ICD-10-CM

## 2019-09-14 ENCOUNTER — HOSPITAL ENCOUNTER (EMERGENCY)
Dept: HOSPITAL 84 - D.ER | Age: 74
Discharge: HOME | End: 2019-09-14
Payer: MEDICARE

## 2019-09-14 VITALS — HEIGHT: 64 IN | BODY MASS INDEX: 21.39 KG/M2 | WEIGHT: 125.26 LBS

## 2019-09-14 VITALS — DIASTOLIC BLOOD PRESSURE: 89 MMHG | SYSTOLIC BLOOD PRESSURE: 172 MMHG

## 2019-09-14 DIAGNOSIS — Z00.00: Primary | ICD-10-CM

## 2019-09-14 DIAGNOSIS — I10: ICD-10-CM

## 2019-09-14 LAB
ALBUMIN SERPL-MCNC: 4 G/DL (ref 3.4–5)
ALP SERPL-CCNC: 68 U/L (ref 46–116)
ALT SERPL-CCNC: 14 U/L (ref 10–68)
ANION GAP SERPL CALC-SCNC: 9.8 MMOL/L (ref 8–16)
APPEARANCE UR: CLEAR
APTT BLD: 27.5 SECONDS (ref 22.8–39.4)
BASOPHILS NFR BLD AUTO: 0.6 % (ref 0–2)
BILIRUB SERPL-MCNC: 0.9 MG/DL (ref 0.2–1.3)
BILIRUB SERPL-MCNC: NEGATIVE MG/DL
BUN SERPL-MCNC: 16 MG/DL (ref 7–18)
CALCIUM SERPL-MCNC: 10.3 MG/DL (ref 8.5–10.1)
CHLORIDE SERPL-SCNC: 104 MMOL/L (ref 98–107)
CO2 SERPL-SCNC: 31.6 MMOL/L (ref 21–32)
COLOR UR: YELLOW
CREAT SERPL-MCNC: 0.7 MG/DL (ref 0.6–1.3)
EOSINOPHIL NFR BLD: 1 % (ref 0–7)
ERYTHROCYTE [DISTWIDTH] IN BLOOD BY AUTOMATED COUNT: 13.2 % (ref 11.5–14.5)
GLOBULIN SER-MCNC: 3 G/L
GLUCOSE SERPL-MCNC: 133 MG/DL (ref 74–106)
GLUCOSE SERPL-MCNC: NEGATIVE MG/DL
HCT VFR BLD CALC: 41.3 % (ref 36–48)
HGB BLD-MCNC: 13.8 G/DL (ref 12–16)
IMM GRANULOCYTES NFR BLD: 0.1 % (ref 0–5)
INR PPP: 1.09 (ref 0.85–1.17)
KETONES UR STRIP-MCNC: NEGATIVE MG/DL
LYMPHOCYTES NFR BLD AUTO: 22.8 % (ref 15–50)
MAGNESIUM SERPL-MCNC: 2 MG/DL (ref 1.8–2.4)
MCH RBC QN AUTO: 33.1 PG (ref 26–34)
MCHC RBC AUTO-ENTMCNC: 33.4 G/DL (ref 31–37)
MCV RBC: 99 FL (ref 80–100)
MONOCYTES NFR BLD: 8.2 % (ref 2–11)
NEUTROPHILS NFR BLD AUTO: 67.3 % (ref 40–80)
NITRITE UR-MCNC: NEGATIVE MG/ML
OSMOLALITY SERPL CALC.SUM OF ELEC: 285 MOSM/KG (ref 275–300)
PH UR STRIP: 7 [PH] (ref 5–6)
PLATELET # BLD: 262 10X3/UL (ref 130–400)
PMV BLD AUTO: 10 FL (ref 7.4–10.4)
POTASSIUM SERPL-SCNC: 3.4 MMOL/L (ref 3.5–5.1)
PROT SERPL-MCNC: 7 G/DL (ref 6.4–8.2)
PROT UR-MCNC: NEGATIVE MG/DL
PROTHROMBIN TIME: 13.6 SECONDS (ref 11.6–15)
RBC # BLD AUTO: 4.17 10X6/UL (ref 4–5.4)
SODIUM SERPL-SCNC: 142 MMOL/L (ref 136–145)
SP GR UR STRIP: 1.01 (ref 1–1.02)
TSH SERPL-ACNC: 1.56 UIU/ML (ref 0.36–3.74)
UROBILINOGEN UR-MCNC: NORMAL MG/DL
WBC # BLD AUTO: 7 10X3/UL (ref 4.8–10.8)

## 2020-02-20 ENCOUNTER — HOSPITAL ENCOUNTER (OUTPATIENT)
Dept: HOSPITAL 84 - D.US | Age: 75
Discharge: HOME | End: 2020-02-20
Attending: THORACIC SURGERY (CARDIOTHORACIC VASCULAR SURGERY)
Payer: MEDICARE

## 2020-02-20 VITALS — BODY MASS INDEX: 21.5 KG/M2

## 2020-02-20 DIAGNOSIS — I65.23: Primary | ICD-10-CM

## 2020-08-31 ENCOUNTER — HOSPITAL ENCOUNTER (EMERGENCY)
Dept: HOSPITAL 84 - D.ER | Age: 75
Discharge: NURSING, REHAB OR LONG-TERM CARE | End: 2020-08-31
Payer: MEDICARE

## 2020-08-31 VITALS — DIASTOLIC BLOOD PRESSURE: 84 MMHG | SYSTOLIC BLOOD PRESSURE: 145 MMHG

## 2020-08-31 VITALS
HEIGHT: 64 IN | WEIGHT: 130.27 LBS | BODY MASS INDEX: 22.24 KG/M2 | HEIGHT: 64 IN | BODY MASS INDEX: 22.24 KG/M2 | WEIGHT: 130.27 LBS

## 2020-08-31 DIAGNOSIS — Z95.5: ICD-10-CM

## 2020-08-31 DIAGNOSIS — Z95.1: ICD-10-CM

## 2020-08-31 DIAGNOSIS — S09.90XA: Primary | ICD-10-CM

## 2020-08-31 DIAGNOSIS — I10: ICD-10-CM

## 2020-08-31 DIAGNOSIS — J44.9: ICD-10-CM
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
